# Patient Record
Sex: MALE | Race: WHITE | Employment: FULL TIME | URBAN - METROPOLITAN AREA
[De-identification: names, ages, dates, MRNs, and addresses within clinical notes are randomized per-mention and may not be internally consistent; named-entity substitution may affect disease eponyms.]

---

## 2017-10-13 ENCOUNTER — HOSPITAL ENCOUNTER (OUTPATIENT)
Dept: GENERAL RADIOLOGY | Age: 27
Discharge: HOME | End: 2017-10-13
Payer: COMMERCIAL

## 2017-10-13 DIAGNOSIS — M25.519 PAIN IN JOINT, SHOULDER REGION: ICD-10-CM

## 2017-10-13 DIAGNOSIS — M54.2 CERVICALGIA: ICD-10-CM

## 2017-10-13 PROCEDURE — 73030 X-RAY EXAM OF SHOULDER: CPT

## 2017-10-13 PROCEDURE — 72050 X-RAY EXAM NECK SPINE 4/5VWS: CPT

## 2019-06-06 ENCOUNTER — HOSPITAL ENCOUNTER (OUTPATIENT)
Dept: GENERAL RADIOLOGY | Age: 29
Discharge: HOME | End: 2019-06-06
Payer: COMMERCIAL

## 2019-06-06 DIAGNOSIS — J11.00 INFLUENZA AND PNEUMONIA: ICD-10-CM

## 2019-06-06 PROCEDURE — 71046 X-RAY EXAM CHEST 2 VIEWS: CPT

## 2019-12-14 ENCOUNTER — HOSPITAL ENCOUNTER (EMERGENCY)
Age: 29
Discharge: HOME | End: 2019-12-14
Attending: NURSE PRACTITIONER
Payer: COMMERCIAL

## 2019-12-14 ENCOUNTER — APPOINTMENT (OUTPATIENT)
Dept: CT IMAGING | Age: 29
End: 2019-12-14
Attending: PHYSICIAN ASSISTANT
Payer: COMMERCIAL

## 2019-12-14 ENCOUNTER — APPOINTMENT (OUTPATIENT)
Dept: GENERAL RADIOLOGY | Age: 29
End: 2019-12-14
Attending: PHYSICIAN ASSISTANT
Payer: COMMERCIAL

## 2019-12-14 VITALS
DIASTOLIC BLOOD PRESSURE: 80 MMHG | OXYGEN SATURATION: 100 % | HEART RATE: 83 BPM | TEMPERATURE: 98.4 F | SYSTOLIC BLOOD PRESSURE: 128 MMHG

## 2019-12-14 DIAGNOSIS — H53.8 BLURRY VISION, RIGHT EYE: ICD-10-CM

## 2019-12-14 DIAGNOSIS — R51.9 HEADACHE, UNSPECIFIED HEADACHE TYPE: Primary | ICD-10-CM

## 2019-12-14 LAB
ALBUMIN SERPL-MCNC: 4.5 G/DL (ref 3.5–5)
ALBUMIN/GLOB SERPL: 1.4 {RATIO} (ref 1.1–2.2)
ALP SERPL-CCNC: 67 U/L (ref 45–117)
ALT SERPL-CCNC: 68 U/L (ref 12–78)
ANION GAP SERPL CALC-SCNC: 5 MMOL/L (ref 5–15)
AST SERPL-CCNC: 21 U/L (ref 15–37)
BASOPHILS # BLD: 0 K/UL (ref 0–0.1)
BASOPHILS NFR BLD: 0 % (ref 0–1)
BILIRUB SERPL-MCNC: 0.5 MG/DL (ref 0.2–1)
BUN SERPL-MCNC: 11 MG/DL (ref 6–20)
BUN/CREAT SERPL: 11 (ref 12–20)
CALCIUM SERPL-MCNC: 8.7 MG/DL (ref 8.5–10.1)
CHLORIDE SERPL-SCNC: 108 MMOL/L (ref 97–108)
CO2 SERPL-SCNC: 30 MMOL/L (ref 21–32)
COMMENT, HOLDF: NORMAL
CREAT SERPL-MCNC: 0.97 MG/DL (ref 0.7–1.3)
D DIMER PPP FEU-MCNC: <0.19 MG/L FEU (ref 0–0.65)
DIFFERENTIAL METHOD BLD: ABNORMAL
EOSINOPHIL # BLD: 0.1 K/UL (ref 0–0.4)
EOSINOPHIL NFR BLD: 1 % (ref 0–7)
ERYTHROCYTE [DISTWIDTH] IN BLOOD BY AUTOMATED COUNT: 11.7 % (ref 11.5–14.5)
GLOBULIN SER CALC-MCNC: 3.2 G/DL (ref 2–4)
GLUCOSE SERPL-MCNC: 96 MG/DL (ref 65–100)
HCT VFR BLD AUTO: 45.3 % (ref 36.6–50.3)
HGB BLD-MCNC: 15.5 G/DL (ref 12.1–17)
IMM GRANULOCYTES # BLD AUTO: 0 K/UL (ref 0–0.04)
IMM GRANULOCYTES NFR BLD AUTO: 0 % (ref 0–0.5)
LYMPHOCYTES # BLD: 3.1 K/UL (ref 0.8–3.5)
LYMPHOCYTES NFR BLD: 38 % (ref 12–49)
MCH RBC QN AUTO: 28.8 PG (ref 26–34)
MCHC RBC AUTO-ENTMCNC: 34.2 G/DL (ref 30–36.5)
MCV RBC AUTO: 84.2 FL (ref 80–99)
MONOCYTES # BLD: 0.8 K/UL (ref 0–1)
MONOCYTES NFR BLD: 10 % (ref 5–13)
NEUTS SEG # BLD: 4.3 K/UL (ref 1.8–8)
NEUTS SEG NFR BLD: 51 % (ref 32–75)
NRBC # BLD: 0 K/UL (ref 0–0.01)
NRBC BLD-RTO: 0 PER 100 WBC
PLATELET # BLD AUTO: 291 K/UL (ref 150–400)
PMV BLD AUTO: 8.6 FL (ref 8.9–12.9)
POTASSIUM SERPL-SCNC: 3.9 MMOL/L (ref 3.5–5.1)
PROT SERPL-MCNC: 7.7 G/DL (ref 6.4–8.2)
RBC # BLD AUTO: 5.38 M/UL (ref 4.1–5.7)
SAMPLES BEING HELD,HOLD: NORMAL
SODIUM SERPL-SCNC: 143 MMOL/L (ref 136–145)
TROPONIN I SERPL-MCNC: <0.05 NG/ML
WBC # BLD AUTO: 8.4 K/UL (ref 4.1–11.1)

## 2019-12-14 PROCEDURE — 99284 EMERGENCY DEPT VISIT MOD MDM: CPT

## 2019-12-14 PROCEDURE — 80053 COMPREHEN METABOLIC PANEL: CPT

## 2019-12-14 PROCEDURE — 96374 THER/PROPH/DIAG INJ IV PUSH: CPT

## 2019-12-14 PROCEDURE — 85025 COMPLETE CBC W/AUTO DIFF WBC: CPT

## 2019-12-14 PROCEDURE — 71046 X-RAY EXAM CHEST 2 VIEWS: CPT

## 2019-12-14 PROCEDURE — 70450 CT HEAD/BRAIN W/O DYE: CPT

## 2019-12-14 PROCEDURE — 36415 COLL VENOUS BLD VENIPUNCTURE: CPT

## 2019-12-14 PROCEDURE — 74011250636 HC RX REV CODE- 250/636: Performed by: PHYSICIAN ASSISTANT

## 2019-12-14 PROCEDURE — 85379 FIBRIN DEGRADATION QUANT: CPT

## 2019-12-14 PROCEDURE — 84484 ASSAY OF TROPONIN QUANT: CPT

## 2019-12-14 PROCEDURE — 93005 ELECTROCARDIOGRAM TRACING: CPT

## 2019-12-14 PROCEDURE — 96375 TX/PRO/DX INJ NEW DRUG ADDON: CPT

## 2019-12-14 RX ORDER — PROCHLORPERAZINE EDISYLATE 5 MG/ML
10 INJECTION INTRAMUSCULAR; INTRAVENOUS
Status: COMPLETED | OUTPATIENT
Start: 2019-12-14 | End: 2019-12-14

## 2019-12-14 RX ORDER — DIPHENHYDRAMINE HYDROCHLORIDE 50 MG/ML
25 INJECTION, SOLUTION INTRAMUSCULAR; INTRAVENOUS
Status: COMPLETED | OUTPATIENT
Start: 2019-12-14 | End: 2019-12-14

## 2019-12-14 RX ORDER — KETOROLAC TROMETHAMINE 30 MG/ML
30 INJECTION, SOLUTION INTRAMUSCULAR; INTRAVENOUS
Status: COMPLETED | OUTPATIENT
Start: 2019-12-14 | End: 2019-12-14

## 2019-12-14 RX ORDER — BUTALBITAL, ACETAMINOPHEN, CAFFEINE AND CODEINE PHOSPHATE 50; 325; 40; 30 MG/1; MG/1; MG/1; MG/1
CAPSULE ORAL
COMMUNITY
End: 2020-08-27

## 2019-12-14 RX ADMIN — DIPHENHYDRAMINE HYDROCHLORIDE 25 MG: 50 INJECTION, SOLUTION INTRAMUSCULAR; INTRAVENOUS at 20:28

## 2019-12-14 RX ADMIN — KETOROLAC TROMETHAMINE 30 MG: 30 INJECTION, SOLUTION INTRAMUSCULAR at 20:28

## 2019-12-14 RX ADMIN — PROCHLORPERAZINE EDISYLATE 10 MG: 5 INJECTION INTRAMUSCULAR; INTRAVENOUS at 20:28

## 2019-12-14 RX ADMIN — SODIUM CHLORIDE 1000 ML: 900 INJECTION, SOLUTION INTRAVENOUS at 20:28

## 2019-12-15 LAB
ATRIAL RATE: 71 BPM
CALCULATED P AXIS, ECG09: 42 DEGREES
CALCULATED R AXIS, ECG10: 32 DEGREES
CALCULATED T AXIS, ECG11: 27 DEGREES
DIAGNOSIS, 93000: NORMAL
P-R INTERVAL, ECG05: 118 MS
Q-T INTERVAL, ECG07: 374 MS
QRS DURATION, ECG06: 86 MS
QTC CALCULATION (BEZET), ECG08: 406 MS
VENTRICULAR RATE, ECG03: 71 BPM

## 2019-12-15 NOTE — ED PROVIDER NOTES
66-year-old  male with medical history remarkable for anxiety, depression and variant migraine presenting ambulatory to the emergency department with complaint of acute onset of headache, chest pain, neck pain and blurry vision in the right eye that began suddenly around 5 PM today. Since arriving to the ED developed cough described as dry and frequent in nature. He tried taking ibuprofen for symptoms with minimal improvement. There is associated sore throat. Chest, head, throat and blurry vision  In the rt eye  today around 5 PM today. Developed cough upon arrival.  No fever, ear pain, abdominal pain,nausea, vomiting, diarrhea or urinary complaint. Generalized malaise and myalgias in LE. No extremity numbness or extremity weakness. Denies corrective lens usage at baseline. No past medical history on file. Past Surgical History:   Procedure Laterality Date    HX ORTHOPAEDIC      foot    HX OTHER SURGICAL      cystocele         No family history on file.     Social History     Socioeconomic History    Marital status: SINGLE     Spouse name: Not on file    Number of children: Not on file    Years of education: Not on file    Highest education level: Not on file   Occupational History    Not on file   Social Needs    Financial resource strain: Not on file    Food insecurity:     Worry: Not on file     Inability: Not on file    Transportation needs:     Medical: Not on file     Non-medical: Not on file   Tobacco Use    Smoking status: Current Every Day Smoker     Packs/day: 0.25   Substance and Sexual Activity    Alcohol use: No    Drug use: No    Sexual activity: Not on file   Lifestyle    Physical activity:     Days per week: Not on file     Minutes per session: Not on file    Stress: Not on file   Relationships    Social connections:     Talks on phone: Not on file     Gets together: Not on file     Attends Evangelical service: Not on file     Active member of club or organization: Not on file     Attends meetings of clubs or organizations: Not on file     Relationship status: Not on file    Intimate partner violence:     Fear of current or ex partner: Not on file     Emotionally abused: Not on file     Physically abused: Not on file     Forced sexual activity: Not on file   Other Topics Concern    Not on file   Social History Narrative    Not on file         ALLERGIES: Patient has no known allergies. Review of Systems   Constitutional: Positive for activity change and fatigue. Negative for chills and fever. HENT: Positive for rhinorrhea and sore throat. Negative for congestion, ear pain and voice change. Eyes: Positive for visual disturbance. Negative for photophobia, pain and itching. Respiratory: Positive for cough and shortness of breath. Negative for chest tightness. Cardiovascular: Positive for chest pain. Negative for palpitations. Gastrointestinal: Negative for abdominal distention, abdominal pain, constipation, diarrhea and vomiting. Genitourinary: Negative for difficulty urinating, dysuria, frequency and urgency. Musculoskeletal: Positive for myalgias and neck pain. Negative for joint swelling. Neurological: Positive for headaches. Negative for weakness and numbness. Psychiatric/Behavioral: Negative for confusion and decreased concentration. All other systems reviewed and are negative. Vitals:    12/14/19 1917   BP: 128/80   Pulse: 83   Temp: 98.4 °F (36.9 °C)   SpO2: 100%            Physical Exam  Vitals signs and nursing note reviewed. Constitutional:       General: He is not in acute distress. Appearance: He is well-developed. He is not diaphoretic. Comments:  male in NAD   HENT:      Head: Normocephalic and atraumatic. Right Ear: External ear normal.      Left Ear: External ear normal.      Nose: Nose normal.      Mouth/Throat:      Pharynx: No oropharyngeal exudate.    Eyes:      General: No visual field deficit. Right eye: No discharge. Left eye: No discharge. Conjunctiva/sclera: Conjunctivae normal.      Pupils: Pupils are equal, round, and reactive to light. Comments: Decreased vision in rt eye by confrontation   Neck:      Musculoskeletal: Normal range of motion and neck supple. Cardiovascular:      Rate and Rhythm: Normal rate and regular rhythm. Heart sounds: Normal heart sounds. Pulmonary:      Effort: Pulmonary effort is normal.      Breath sounds: Normal breath sounds. No wheezing or rales. Abdominal:      General: Bowel sounds are normal. There is no distension. Palpations: Abdomen is soft. Tenderness: There is no tenderness. There is no guarding. Musculoskeletal: Normal range of motion. Lymphadenopathy:      Cervical: No cervical adenopathy. Skin:     General: Skin is warm and dry. Neurological:      General: No focal deficit present. Mental Status: He is alert and oriented to person, place, and time. Cranial Nerves: No cranial nerve deficit. Sensory: No sensory deficit. Motor: No weakness or abnormal muscle tone. Coordination: Coordination normal.      Deep Tendon Reflexes: Reflexes are normal and symmetric. Psychiatric:         Behavior: Behavior normal.          MDM  Number of Diagnoses or Management Options  Blurry vision, right eye:   Headache, unspecified headache type:   Diagnosis management comments: 35 yo  male with complaint of HA, rt eye vision loss, CP, ST and cough. Atypical for ICH. ? Vascular HA. Improved with Migraine cocktail. Labs reassuring. 20/30 vision in rt eye.   [facility] [address]         Amount and/or Complexity of Data Reviewed  Clinical lab tests: ordered and reviewed  Tests in the radiology section of CPT®: ordered and reviewed  Independent visualization of images, tracings, or specimens: yes           Procedures    Progress note      EKG interpretation:   Rhythm: normal sinus rhythm; and regular . Rate (approx.): 71; Axis: normal; P wave: normal; QRS interval: normal ; ST/T wave: normal; in  Leads. Kendell Brice    Patient's results have been reviewed with them. Patient and/or family have verbally conveyed their understanding and agreement of the patient's signs, symptoms, diagnosis, treatment and prognosis and additionally agree to follow up as recommended or return to the Emergency Room should their condition change prior to follow-up. Discharge instructions have also been provided to the patient with some educational information regarding their diagnosis as well a list of reasons why they would want to return to the ER prior to their follow-up appointment should their condition change. Kendell Brice      Discussed case with attending Physician Jerri Menchaca. Agrees with care and will D/C with follow up.   Kendell Epperson

## 2019-12-15 NOTE — DISCHARGE INSTRUCTIONS

## 2019-12-15 NOTE — ED TRIAGE NOTES
Patient reports sudden cough. Chest pain, headache, right/posterior neck pain since 1700. Patient also c/o blurry vision in right eye that began with chest pain and headache. C/o generalized weakness.

## 2020-03-11 ENCOUNTER — HOSPITAL ENCOUNTER (EMERGENCY)
Age: 30
Discharge: HOME | End: 2020-03-11
Attending: EMERGENCY MEDICINE
Payer: COMMERCIAL

## 2020-03-11 VITALS
RESPIRATION RATE: 16 BRPM | TEMPERATURE: 98.2 F | OXYGEN SATURATION: 99 % | HEART RATE: 75 BPM | SYSTOLIC BLOOD PRESSURE: 144 MMHG | DIASTOLIC BLOOD PRESSURE: 86 MMHG

## 2020-03-11 DIAGNOSIS — K02.9 PAIN DUE TO DENTAL CARIES: Primary | ICD-10-CM

## 2020-03-11 PROCEDURE — 99282 EMERGENCY DEPT VISIT SF MDM: CPT

## 2020-03-11 PROCEDURE — 99283 EMERGENCY DEPT VISIT LOW MDM: CPT

## 2020-03-11 PROCEDURE — 74011000250 HC RX REV CODE- 250: Performed by: PHYSICIAN ASSISTANT

## 2020-03-11 PROCEDURE — 74011250637 HC RX REV CODE- 250/637: Performed by: PHYSICIAN ASSISTANT

## 2020-03-11 RX ORDER — TRAMADOL HYDROCHLORIDE 50 MG/1
50 TABLET ORAL
Qty: 9 TAB | Refills: 0 | Status: SHIPPED | OUTPATIENT
Start: 2020-03-11 | End: 2020-03-14

## 2020-03-11 RX ADMIN — LIDOCAINE HYDROCHLORIDE: 20 SOLUTION ORAL; TOPICAL at 19:21

## 2020-03-11 NOTE — ED PROVIDER NOTES
34year old male presenting to the ED for facial pain. Pt reports pain from the left ear to the left chin, that waxes and wanes from 0/10 to 11/10. Went to [employer] yesterday, given penicillin, diclofenac, notes that he has had no relief. Pt notes that there is one particular tooth that he notices seems to reproduce the pain, notes that if he hits the tooth while eating he will have sudden onset of severe pain. Pain started yesterday, notes that he previously knew that he needed to have the tooth removed. Has dentist appt scheduled for this Saturday (in 3 days). Some feeling of swelling, no fever or drainage. + pressure. No dysphagia. PMHx: depression, anxiety  PSx: cystocele, foot surgery, debridement of hip bone  Social: + tobacco.             Past Medical History:   Diagnosis Date    Anxiety     Depression     Headache, variant migraine        Past Surgical History:   Procedure Laterality Date    HX ORTHOPAEDIC      foot    HX OTHER SURGICAL      cystocele         History reviewed. No pertinent family history.     Social History     Socioeconomic History    Marital status: SINGLE     Spouse name: Not on file    Number of children: Not on file    Years of education: Not on file    Highest education level: Not on file   Occupational History    Not on file   Social Needs    Financial resource strain: Not on file    Food insecurity     Worry: Not on file     Inability: Not on file    Transportation needs     Medical: Not on file     Non-medical: Not on file   Tobacco Use    Smoking status: Current Every Day Smoker     Packs/day: 0.25    Smokeless tobacco: Never Used   Substance and Sexual Activity    Alcohol use: No    Drug use: No    Sexual activity: Not on file   Lifestyle    Physical activity     Days per week: Not on file     Minutes per session: Not on file    Stress: Not on file   Relationships    Social connections     Talks on phone: Not on file     Gets together: Not on file Attends Hinduism service: Not on file     Active member of club or organization: Not on file     Attends meetings of clubs or organizations: Not on file     Relationship status: Not on file    Intimate partner violence     Fear of current or ex partner: Not on file     Emotionally abused: Not on file     Physically abused: Not on file     Forced sexual activity: Not on file   Other Topics Concern    Not on file   Social History Narrative    Not on file         ALLERGIES: Patient has no known allergies. Review of Systems   Constitutional: Negative for fever. HENT: Positive for dental problem. Negative for facial swelling. Respiratory: Negative for shortness of breath. Cardiovascular: Negative for chest pain. Gastrointestinal: Negative for vomiting. Skin: Negative for wound. Neurological: Negative for syncope. All other systems reviewed and are negative. Vitals:    03/11/20 1700   BP: 144/86   Pulse: 75   Resp: 16   Temp: 98.2 °F (36.8 °C)   SpO2: 99%            Physical Exam  Vitals signs and nursing note reviewed. Constitutional:       General: He is not in acute distress. Appearance: He is well-developed. Comments: Pleasant WM   HENT:      Head: Normocephalic and atraumatic. Right Ear: External ear normal.      Left Ear: External ear normal.      Mouth/Throat:        Comments: Normal sublingual space  No submandibular edema  No facial swelling or cellulitis  No trismus, swelling, drooling  Eyes:      General: No scleral icterus. Conjunctiva/sclera: Conjunctivae normal.   Neck:      Musculoskeletal: Neck supple. Trachea: No tracheal deviation. Cardiovascular:      Rate and Rhythm: Normal rate and regular rhythm. Heart sounds: Normal heart sounds. No murmur. No friction rub. No gallop. Pulmonary:      Effort: Pulmonary effort is normal. No respiratory distress. Breath sounds: Normal breath sounds. No stridor. No wheezing.    Abdominal: General: There is no distension. Palpations: Abdomen is soft. Musculoskeletal: Normal range of motion. Skin:     General: Skin is warm and dry. Neurological:      Mental Status: He is alert and oriented to person, place, and time. Psychiatric:         Behavior: Behavior normal.          MDM  Number of Diagnoses or Management Options  Diagnosis management comments: 40-year-old male presenting to the ER for pain at the site of a large dental cavity. Unremarkable , pt reports inability to sleep. Will treat with topical anesthetic and 5 tabs of pain medicine PRN, has dental appt in 3 days.        Amount and/or Complexity of Data Reviewed  Discuss the patient with other providers: yes (Dr. Nellie Murdock ED attending)           Procedures

## 2020-03-11 NOTE — DISCHARGE INSTRUCTIONS
Patient Education        Tooth Decay: Care Instructions  Your Care Instructions    Tooth decay is damage to a tooth caused by plaque. Plaque is a thin film of bacteria that sticks to the teeth above and below the gum line. If plaque isn't removed from the teeth, it can build up and harden into tartar. The bacteria in plaque and tartar use sugars in food to make acids. These acids can cause tooth decay and gum disease. Any part of your tooth can decay, from the roots below the gum line to the chewing surface. Decay can affect the outer layer (enamel) or inner layer (dentin) of your teeth. The deeper the decay, the worse the damage. Untreated tooth decay will get worse and may lead to tooth loss. If you have a small hole (cavity) in your tooth, your dentist can repair it by removing the decay and filling the hole. If you have deeper decay, you may need more treatment. A very badly damaged tooth may have to be removed. Follow-up care is a key part of your treatment and safety. Be sure to make and go to all appointments, and call your dentist if you are having problems. It's also a good idea to know your test results and keep a list of the medicines you take. How can you care for yourself at home? If you have pain:  · Take an over-the-counter pain medicine, such as acetaminophen (Tylenol), ibuprofen (Advil, Motrin), or naproxen (Aleve). Be safe with medicines. Read and follow all instructions on the label. ? Do not take two or more pain medicines at the same time unless the doctor told you to. Many pain medicines have acetaminophen, which is Tylenol. Too much acetaminophen (Tylenol) can be harmful. · Put ice or a cold pack on your cheek over the tooth for 10 to 15 minutes at a time. Put a thin cloth between the ice and your skin. To prevent tooth decay  · Brush teeth twice a day, and floss once a day. Brushing with fluoride toothpaste and flossing may be enough to reverse early decay.   · Use a toothbrush with soft, rounded-end bristles and a head that is small enough to reach all parts of your teeth and mouth. Replace your toothbrush every 3 or 4 months. You may also use an electric toothbrush that has rotating and oscillating (back-and-forth) action. · Ask your dentist about having fluoride treatments at the dental office. · Brush your tongue to help get rid of bacteria. · Eat healthy foods that include whole grains, vegetables, and fruits. · Have your teeth cleaned by a professional at least two times a year. · Do not smoke or use smokeless tobacco. Tobacco can make tooth decay worse. When should you call for help? Call 911 anytime you think you may need emergency care. For example, call if:    · You have trouble breathing.    Call your dentist now or seek immediate medical care if:    · You have new or worse symptoms of infection, such as:  ? Increased pain, swelling, warmth, or redness. ? Red streaks leading from the area. ? Pus draining from the area. ? A fever.    Watch closely for changes in your health, and be sure to contact your doctor if:    · You do not get better as expected. Where can you learn more? Go to http://bell-robyn.info/. Enter G415 in the search box to learn more about \"Tooth Decay: Care Instructions. \"  Current as of: October 3, 2018  Content Version: 12.2  © 5988-9194 [employer]. Care instructions adapted under license by [employer] (which disclaims liability or warranty for this information). If you have questions about a medical condition or this instruction, always ask your healthcare professional. [place] [address] any warranty or liability for your use of this information.

## 2020-03-11 NOTE — ED TRIAGE NOTES
Patient presents from home with complaints of left jaw pain that started yesterday. Patient was seen at [facility] for this issue and started on an antibiotic.   Patient reports he has a tooth that needs to come out and he has an appointment with his dentist on Saturday to get he tooth taken out

## 2020-08-06 ENCOUNTER — HOSPITAL ENCOUNTER (EMERGENCY)
Age: 30
Discharge: HOME | End: 2020-08-06
Attending: EMERGENCY MEDICINE
Payer: COMMERCIAL

## 2020-08-06 ENCOUNTER — APPOINTMENT (OUTPATIENT)
Dept: GENERAL RADIOLOGY | Age: 30
End: 2020-08-06
Attending: EMERGENCY MEDICINE
Payer: COMMERCIAL

## 2020-08-06 VITALS
DIASTOLIC BLOOD PRESSURE: 77 MMHG | BODY MASS INDEX: 20.8 KG/M2 | RESPIRATION RATE: 16 BRPM | HEIGHT: 65 IN | SYSTOLIC BLOOD PRESSURE: 135 MMHG | OXYGEN SATURATION: 100 % | TEMPERATURE: 98.4 F | HEART RATE: 68 BPM

## 2020-08-06 DIAGNOSIS — R06.02 SOB (SHORTNESS OF BREATH): Primary | ICD-10-CM

## 2020-08-06 DIAGNOSIS — R05.9 COUGH: ICD-10-CM

## 2020-08-06 LAB
ALBUMIN SERPL-MCNC: 4.6 G/DL (ref 3.5–5)
ALBUMIN/GLOB SERPL: 1.4 {RATIO} (ref 1.1–2.2)
ALP SERPL-CCNC: 64 U/L (ref 45–117)
ALT SERPL-CCNC: 29 U/L (ref 12–78)
ANION GAP SERPL CALC-SCNC: 1 MMOL/L (ref 5–15)
AST SERPL-CCNC: 12 U/L (ref 15–37)
BASOPHILS # BLD: 0 K/UL (ref 0–0.1)
BASOPHILS NFR BLD: 0 % (ref 0–1)
BILIRUB SERPL-MCNC: 1 MG/DL (ref 0.2–1)
BUN SERPL-MCNC: 18 MG/DL (ref 6–20)
BUN/CREAT SERPL: 19 (ref 12–20)
CALCIUM SERPL-MCNC: 8.8 MG/DL (ref 8.5–10.1)
CHLORIDE SERPL-SCNC: 107 MMOL/L (ref 97–108)
CK SERPL-CCNC: 87 U/L (ref 39–308)
CO2 SERPL-SCNC: 29 MMOL/L (ref 21–32)
CREAT SERPL-MCNC: 0.97 MG/DL (ref 0.7–1.3)
DIFFERENTIAL METHOD BLD: ABNORMAL
EOSINOPHIL # BLD: 0 K/UL (ref 0–0.4)
EOSINOPHIL NFR BLD: 0 % (ref 0–7)
ERYTHROCYTE [DISTWIDTH] IN BLOOD BY AUTOMATED COUNT: 11.4 % (ref 11.5–14.5)
GLOBULIN SER CALC-MCNC: 3.3 G/DL (ref 2–4)
GLUCOSE SERPL-MCNC: 111 MG/DL (ref 65–100)
HCT VFR BLD AUTO: 43.8 % (ref 36.6–50.3)
HGB BLD-MCNC: 15.5 G/DL (ref 12.1–17)
IMM GRANULOCYTES # BLD AUTO: 0 K/UL (ref 0–0.04)
IMM GRANULOCYTES NFR BLD AUTO: 0 % (ref 0–0.5)
LYMPHOCYTES # BLD: 1.6 K/UL (ref 0.8–3.5)
LYMPHOCYTES NFR BLD: 20 % (ref 12–49)
MCH RBC QN AUTO: 29.5 PG (ref 26–34)
MCHC RBC AUTO-ENTMCNC: 35.4 G/DL (ref 30–36.5)
MCV RBC AUTO: 83.4 FL (ref 80–99)
MONOCYTES # BLD: 0.5 K/UL (ref 0–1)
MONOCYTES NFR BLD: 6 % (ref 5–13)
NEUTS SEG # BLD: 6.1 K/UL (ref 1.8–8)
NEUTS SEG NFR BLD: 74 % (ref 32–75)
NRBC # BLD: 0 K/UL (ref 0–0.01)
NRBC BLD-RTO: 0 PER 100 WBC
PLATELET # BLD AUTO: 279 K/UL (ref 150–400)
PMV BLD AUTO: 8.5 FL (ref 8.9–12.9)
POTASSIUM SERPL-SCNC: 3.8 MMOL/L (ref 3.5–5.1)
PROT SERPL-MCNC: 7.9 G/DL (ref 6.4–8.2)
RBC # BLD AUTO: 5.25 M/UL (ref 4.1–5.7)
SODIUM SERPL-SCNC: 137 MMOL/L (ref 136–145)
TROPONIN I SERPL-MCNC: <0.05 NG/ML
WBC # BLD AUTO: 8.3 K/UL (ref 4.1–11.1)

## 2020-08-06 PROCEDURE — 36415 COLL VENOUS BLD VENIPUNCTURE: CPT

## 2020-08-06 PROCEDURE — 84484 ASSAY OF TROPONIN QUANT: CPT

## 2020-08-06 PROCEDURE — 93005 ELECTROCARDIOGRAM TRACING: CPT

## 2020-08-06 PROCEDURE — 80053 COMPREHEN METABOLIC PANEL: CPT

## 2020-08-06 PROCEDURE — 99284 EMERGENCY DEPT VISIT MOD MDM: CPT

## 2020-08-06 PROCEDURE — 71045 X-RAY EXAM CHEST 1 VIEW: CPT

## 2020-08-06 PROCEDURE — 87635 SARS-COV-2 COVID-19 AMP PRB: CPT

## 2020-08-06 PROCEDURE — 82550 ASSAY OF CK (CPK): CPT

## 2020-08-06 PROCEDURE — 85025 COMPLETE CBC W/AUTO DIFF WBC: CPT

## 2020-08-06 NOTE — ED NOTES
Assumed care of pt, pt alert and oriented x 4; c/o sob & lightheadedness x 4-5 hours; nausea, sore throat, cough; runny nose x 3 days; placed on monitor x 3; pt has a hx of anxiety; Dr Pipe Hung at bedside for eval; call bell within reach, will continue to monitor

## 2020-08-07 ENCOUNTER — PATIENT OUTREACH (OUTPATIENT)
Dept: CASE MANAGEMENT | Age: 30
End: 2020-08-07

## 2020-08-07 LAB
ATRIAL RATE: 78 BPM
CALCULATED P AXIS, ECG09: 85 DEGREES
CALCULATED R AXIS, ECG10: 64 DEGREES
CALCULATED T AXIS, ECG11: 56 DEGREES
DIAGNOSIS, 93000: NORMAL
P-R INTERVAL, ECG05: 152 MS
Q-T INTERVAL, ECG07: 360 MS
QRS DURATION, ECG06: 82 MS
QTC CALCULATION (BEZET), ECG08: 410 MS
VENTRICULAR RATE, ECG03: 78 BPM

## 2020-08-07 NOTE — ED PROVIDER NOTES
EMERGENCY DEPARTMENT HISTORY AND PHYSICAL EXAM      Date: 8/6/2020  Patient Name: Miguel Angel Sosa    History of Presenting Illness     Chief Complaint   Patient presents with    Shortness of Breath     Patient Reports SOB, Non-Productive Cough, Nausea and Lightheadness that Started Today. Patient Denies Any Resp. History        History Provided By: Patient    HPI: Miguel Angel Sosa, 27 y.o. male with history of anxiety, depression, hypertension presents to the ED with cc of shortness of breath, cough, lightheadedness. Patient noticed symptoms when he was shopping at the grocery store today. He felt lightheaded on his feet and felt like he might pass out. He has not experienced any syncope. Denies any chest pain, chest tightness, palpitations. He states that he has been feeling short of breath all day today. He does have dry nonproductive cough. He does endorse generalized nausea but without vomiting. He has had intermittent episodes of diarrhea over the past few days. Denies any loss of sense of smell or sense of taste. 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 He denies any other symptoms. There are no other complaints, changes, or physical findings at this time. PCP: Jean Forrest MD    No current facility-administered medications on file prior to encounter. Current Outpatient Medications on File Prior to Encounter   Medication Sig Dispense Refill    codeine-butalbital-acetaminophen-caffeine (FIORICET WITH CODEINE) -63-30 mg capsule Take  by mouth every six (6) hours as needed for Headache.  bupropion HCl (WELLBUTRIN PO) Take  by mouth daily.  ALPRAZOLAM PO Take  by mouth as needed.          Past History     Past Medical History:  Past Medical History:   Diagnosis Date    Anxiety     Depression     Headache, variant migraine        Past Surgical History:  Past Surgical History:   Procedure Laterality Date    HX ORTHOPAEDIC      foot    HX OTHER SURGICAL      cystocele       Family History:  No family history on file. Social History:  Social History     Tobacco Use    Smoking status: Current Every Day Smoker     Packs/day: 0.25    Smokeless tobacco: Never Used   Substance Use Topics    Alcohol use: No    Drug use: No       Allergies:  No Known Allergies      Review of Systems   Review of Systems   Constitutional: Negative for chills and fever. HENT: Negative. Eyes: Negative for visual disturbance. Respiratory: Positive for cough and shortness of breath. Cardiovascular: Negative for chest pain and leg swelling. Gastrointestinal: Positive for diarrhea and nausea. Negative for abdominal pain and vomiting. Genitourinary: Negative. Musculoskeletal: Negative for back pain and gait problem. Skin: Negative for color change and rash. Neurological: Positive for light-headedness. Negative for dizziness, weakness and headaches. Hematological: Does not bruise/bleed easily. All other systems reviewed and are negative. Physical Exam   Physical Exam  Vitals signs reviewed. Constitutional:       General: He is not in acute distress. Appearance: Normal appearance. He is not ill-appearing, toxic-appearing or diaphoretic. HENT:      Head: Normocephalic and atraumatic. Cardiovascular:      Rate and Rhythm: Normal rate and regular rhythm. Heart sounds: Normal heart sounds. No murmur. Pulmonary:      Effort: Pulmonary effort is normal. No respiratory distress. Breath sounds: Normal breath sounds. No wheezing. Abdominal:      Palpations: Abdomen is soft. Tenderness: There is no abdominal tenderness. There is no guarding or rebound. Skin:     General: Skin is warm and dry. Findings: No erythema or rash. Neurological:      General: No focal deficit present. Mental Status: He is alert and oriented to person, place, and time. Diagnostic Study Results     Labs -     Recent Results (from the past 12 hour(s))   EKG, 12 LEAD, INITIAL    Collection Time: 08/06/20  6:31 PM   Result Value Ref Range    Ventricular Rate 78 BPM    Atrial Rate 78 BPM    P-R Interval 152 ms    QRS Duration 82 ms    Q-T Interval 360 ms    QTC Calculation (Bezet) 410 ms    Calculated P Axis 85 degrees    Calculated R Axis 64 degrees    Calculated T Axis 56 degrees    Diagnosis       Normal sinus rhythm  Normal ECG  When compared with ECG of 14-DEC-2019 19:59,  No significant change was found     CBC WITH AUTOMATED DIFF    Collection Time: 08/06/20  6:33 PM   Result Value Ref Range    WBC 8.3 4.1 - 11.1 K/uL    RBC 5.25 4. 10 - 5.70 M/uL    HGB 15.5 12.1 - 17.0 g/dL    HCT 43.8 36.6 - 50.3 %    MCV 83.4 80.0 - 99.0 FL    MCH 29.5 26.0 - 34.0 PG    MCHC 35.4 30.0 - 36.5 g/dL    RDW 11.4 (L) 11.5 - 14.5 %    PLATELET 493 735 - 499 K/uL    MPV 8.5 (L) 8.9 - 12.9 FL    NRBC 0.0 0  WBC    ABSOLUTE NRBC 0.00 0.00 - 0.01 K/uL    NEUTROPHILS 74 32 - 75 %    LYMPHOCYTES 20 12 - 49 %    MONOCYTES 6 5 - 13 %    EOSINOPHILS 0 0 - 7 %    BASOPHILS 0 0 - 1 %    IMMATURE GRANULOCYTES 0 0.0 - 0.5 %    ABS. NEUTROPHILS 6.1 1.8 - 8.0 K/UL    ABS. LYMPHOCYTES 1.6 0.8 - 3.5 K/UL    ABS. MONOCYTES 0.5 0.0 - 1.0 K/UL    ABS. EOSINOPHILS 0.0 0.0 - 0.4 K/UL    ABS. BASOPHILS 0.0 0.0 - 0.1 K/UL    ABS. IMM.  GRANS. 0.0 0.00 - 0.04 K/UL    DF AUTOMATED     METABOLIC PANEL, COMPREHENSIVE    Collection Time: 08/06/20  6:33 PM   Result Value Ref Range    Sodium 137 136 - 145 mmol/L    Potassium 3.8 3.5 - 5.1 mmol/L    Chloride 107 97 - 108 mmol/L    CO2 29 21 - 32 mmol/L    Anion gap 1 (L) 5 - 15 mmol/L    Glucose 111 (H) 65 - 100 mg/dL    BUN 18 6 - 20 MG/DL    Creatinine 0.97 0.70 - 1.30 MG/DL    BUN/Creatinine ratio 19 12 - 20      GFR est AA >60 >60 ml/min/1.73m2    GFR est non-AA >60 >60 ml/min/1.73m2    Calcium 8.8 8.5 - 10.1 MG/DL    Bilirubin, total 1.0 0.2 - 1.0 MG/DL    ALT (SGPT) 29 12 - 78 U/L    AST (SGOT) 12 (L) 15 - 37 U/L    Alk. phosphatase 64 45 - 117 U/L    Protein, total 7.9 6.4 - 8.2 g/dL    Albumin 4.6 3.5 - 5.0 g/dL    Globulin 3.3 2.0 - 4.0 g/dL    A-G Ratio 1.4 1.1 - 2.2     CK W/ REFLX CKMB    Collection Time: 08/06/20  6:33 PM   Result Value Ref Range    CK 87 39 - 308 U/L   TROPONIN I    Collection Time: 08/06/20  6:33 PM   Result Value Ref Range    Troponin-I, Qt. <0.05 <0.05 ng/mL   SARS-COV-2    Collection Time: 08/06/20  9:04 PM   Result Value Ref Range    Specimen source Nasopharyngeal      SARS-CoV-2 PENDING     SARS-CoV-2 PENDING     Specimen source Nasopharyngeal      COVID-19 rapid test PENDING     Specimen type NP Swab      Health status PENDING     COVID-19 PENDING        Radiologic Studies -   XR CHEST PORT   Final Result   IMPRESSION: No Acute Disease. CT Results  (Last 48 hours)    None        CXR Results  (Last 48 hours)               08/06/20 1950  XR CHEST PORT Final result    Impression:  IMPRESSION: No Acute Disease. Narrative:  EXAM: Portable CXR. 1940 hours. INDICATION: Shortness of breath       FINDINGS:   The lungs appear clear. Heart is normal in size. There is no pulmonary edema. There is no evident pneumothorax, adenopathy or pleural effusion. Medical Decision Making   I am the first provider for this patient. I reviewed the vital signs, available nursing notes, past medical history, past surgical history, family history and social history. Vital Signs-Reviewed the patient's vital signs. Patient Vitals for the past 12 hrs:   Temp Pulse Resp BP SpO2   08/06/20 2010 -- 68 -- 135/77 100 %   08/06/20 2009 -- 69 -- 139/71 100 %   08/06/20 2008 -- 70 -- 136/63 100 %   08/06/20 1828 98.4 °F (36.9 °C) 80 16 157/72 100 %       Records Reviewed: Nursing Notes    Provider Notes (Medical Decision Making):   80-year-old male here with shortness of breath, cough, general lightheadedness.   On examination appears clinically well and nontoxic. He is afebrile and vital signs are stable. O2 sats 100% on room air. There is no increased work of breathing. Lungs clear to auscultation bilaterally. Chest x-ray unremarkable and blood work is reassuring. Troponin negative. He is afebrile. I will obtain orthostatics and ensure there is no ambulatory hypoxia. I will obtain SARS-CoV-2 test and anticipate discharge home. ED Course:   Initial assessment performed. The patients presenting problems have been discussed, and they are in agreement with the care plan formulated and outlined with them. I have encouraged them to ask questions as they arise throughout their visit. ED Course as of Aug 06 2343   Thu Aug 06, 2020   2007 EKG per my interpretation normal sinus rhythm, rate 78 bpm, normal axis, no acute ischemic changes. [AK]   2020 Orthostatics negative. Patient is able to ambulate throughout ED without severe respiratory distress or hypoxia. I feel he can be safely discharged home. I will obtain SARS-CoV-2 swab before he is discharged. Patient will be notified of any results. Patient given strict return to ED precautions and encouraged to keep an eye on pulse oximetry at home. All questions answered and he agrees with plan as above. [AK]      ED Course User Index  [AK] Jo Sharma MD       Discharge Note:  The patient has been re-evaluated and is ready for discharge. Reviewed available results with patient. Counseled patient on diagnosis and care plan. Patient has expressed understanding, and all questions have been answered. Patient agrees with plan and agrees to follow up as recommended, or to return to the ED if their symptoms worsen. Discharge instructions have been provided and explained to the patient, along with reasons to return to the ED. Disposition:  Discharge home    DISCHARGE PLAN:  1. Discharge Medication List as of 8/6/2020  9:13 PM        2.    Follow-up Information Follow up With Specialties Details Why [address] Gomez MD Internal Medicine Schedule an appointment as soon as possible for a visit   [address]  [phone]      [facility] [place] EMERGENCY DEPT Emergency Medicine Go to  As needed, If symptoms worsen [address]  [address]  [phone]        3. Return to ED if worse     Diagnosis     Clinical Impression:   1. SOB (shortness of breath)    2. Cough        Attestations:    Daljit Carter MD    Please note that this dictation was completed with [employer], the [employer] voice recognition software. Quite often unanticipated grammatical, syntax, homophones, and other interpretive errors are inadvertently transcribed by the computer software. Please disregard these errors. Please excuse any errors that have escaped final proofreading. Thank you.

## 2020-08-07 NOTE — PROGRESS NOTES
Patient contacted regarding recent discharge and COVID-19 risk. Discussed COVID-19 related testing which was pending at this time. Test results were pending. Patient informed of results, if available? ACM discussed pending status and note from PA stating results were negative. Patient advised to continue checking myChart for results to post and follow CDC guidelines. Patient verbalized understanding. Ambulatory Care Manager contacted the patient by telephone to perform post discharge assessment. Verified name and  with patient as identifiers. Patient has following risk factors of: ED visit 20. ACM reviewed discharge instructions, medical action plan and red flags related to discharge diagnosis. There were no new or changed medications related to discharge diagnosis. Patient denied questions or concerns regarding discharge instructions or medications. Advance Care Planning:   Does patient have an Advance Directive: not on file; education provided     Patient verified healthcare decision makers as correctly listed in chart: Andreas Bernal (parent) [phone]    Education provided regarding infection prevention, and signs and symptoms of COVID-19 and when to seek medical attention with patient who verbalized understanding. Discussed exposure protocols and quarantine from [address] [place] [address] you at higher risk for severe illness  and given an opportunity for questions and concerns. The patient was supplied the COVID-19 hotline [phone] and local [facility] department hotline [phone]. AC recommended patient follow up with PCP and provided ACM contact information for future reference. From CDC: Are you at higher risk for severe illness?  Wash your hands often and avoid touching eyes, nose and mouth.  Avoid close contact (6 feet, which is about two arm lengths) with people who are sick.    Put distance between yourself and other people if COVID-19 is spreading in your community.  Clean and disinfect frequently touched surfaces.  Avoid all cruise travel and non-essential air travel.  Call your healthcare professional if you have concerns about COVID-19 and your underlying condition or if you are sick. For more information on steps you can take to protect yourself, see CDC's How to Protect Yourself      Patient/family/caregiver given information for GetWell Loop and agrees to enroll yes  Patient's preferred e-mail:  [email or web address]  Patient's preferred phone number: [phone]  Based on Loop alert triggers, patient will be contacted by nurse care manager for worsening symptoms. Pt will be further monitored by COVID Loop Team based on severity of symptoms and risk factors.     Broadus Sandhoff, RN  Ambulatory Care Manager

## 2020-08-08 LAB
COVID-19, XGCOVT: NOT DETECTED
HEALTH STATUS, XMCV2T: NORMAL
SOURCE, COVRS: NORMAL
SPECIMEN SOURCE, FCOV2M: NORMAL
SPECIMEN TYPE, XMCV1T: NORMAL

## 2020-08-12 ENCOUNTER — PATIENT OUTREACH (OUTPATIENT)
Dept: CASE MANAGEMENT | Age: 30
End: 2020-08-12

## 2020-08-12 NOTE — PROGRESS NOTES
Yellow alert noted in remote symptom monitoring program. Messaged patient to notify Gt Perez if symptoms have worsened since yesterday or if they would like to have a nurse reach out.

## 2020-08-27 ENCOUNTER — OFFICE VISIT (OUTPATIENT)
Dept: URGENT CARE | Age: 30
End: 2020-08-27
Payer: COMMERCIAL

## 2020-08-27 VITALS — HEART RATE: 66 BPM | OXYGEN SATURATION: 96 % | RESPIRATION RATE: 15 BRPM | TEMPERATURE: 98.2 F

## 2020-08-27 DIAGNOSIS — J02.9 SORE THROAT: ICD-10-CM

## 2020-08-27 DIAGNOSIS — R05.9 COUGH: Primary | ICD-10-CM

## 2020-08-27 DIAGNOSIS — Z11.59 SCREENING FOR VIRAL DISEASE: ICD-10-CM

## 2020-08-27 PROCEDURE — 99203 OFFICE O/P NEW LOW 30 MIN: CPT | Performed by: FAMILY MEDICINE

## 2020-08-27 RX ORDER — BUPROPION HYDROCHLORIDE 75 MG/1
75 TABLET ORAL 2 TIMES DAILY
COMMUNITY

## 2020-08-27 RX ORDER — AMOXICILLIN 500 MG/1
CAPSULE ORAL
COMMUNITY
Start: 2020-06-16

## 2020-08-27 RX ORDER — ALPRAZOLAM 0.25 MG/1
0.25 TABLET ORAL
COMMUNITY

## 2020-08-27 RX ORDER — LISINOPRIL 20 MG/1
TABLET ORAL
COMMUNITY
Start: 2020-06-11 | End: 2021-09-24 | Stop reason: SINTOL

## 2020-08-27 RX ORDER — IBUPROFEN 800 MG/1
TABLET ORAL
COMMUNITY
Start: 2020-06-16

## 2020-08-27 RX ORDER — TRAZODONE HYDROCHLORIDE 50 MG/1
TABLET ORAL
COMMUNITY
Start: 2020-06-11

## 2020-08-27 NOTE — PROGRESS NOTES
This patient was seen in [facility] at [address] [place] [address] Urgent Care while in their vehicle due to COVID-19 pandemic with PPE and focused examination in order to decrease community viral transmission. The patient/guardian gave verbal consent to treat. Mychal Villeda is a 27 y.o. male who presents for with cough, STx 3 weeks. Currently on amoxicillin for dental infection. Has been on doxycycline and zpak in the past 3 weeks as well. No known COVID-19 exposure. Denies  fever, SOB. PMH: anxiety, HTN. Smoker/Non-smoker. The history is provided by the patient. Past Medical History:   Diagnosis Date    Anxiety     Asthma     Depression     Headache, variant migraine     Hypertension         Past Surgical History:   Procedure Laterality Date    HX ORTHOPAEDIC      foot    HX OTHER SURGICAL      cystocele         No family history on file.      Social History     Socioeconomic History    Marital status: SINGLE     Spouse name: Not on file    Number of children: Not on file    Years of education: Not on file    Highest education level: Not on file   Occupational History    Not on file   Social Needs    Financial resource strain: Not on file    Food insecurity     Worry: Not on file     Inability: Not on file    Transportation needs     Medical: Not on file     Non-medical: Not on file   Tobacco Use    Smoking status: Current Every Day Smoker     Packs/day: 0.25    Smokeless tobacco: Never Used   Substance and Sexual Activity    Alcohol use: No    Drug use: No    Sexual activity: Not on file   Lifestyle    Physical activity     Days per week: Not on file     Minutes per session: Not on file    Stress: Not on file   Relationships    Social connections     Talks on phone: Not on file     Gets together: Not on file     Attends Jainism service: Not on file     Active member of club or organization: Not on file     Attends meetings of clubs or organizations: Not on file     Relationship status: Not on file    Intimate partner violence     Fear of current or ex partner: Not on file     Emotionally abused: Not on file     Physically abused: Not on file     Forced sexual activity: Not on file   Other Topics Concern    Not on file   Social History Narrative    Not on file                ALLERGIES: Patient has no known allergies. Review of Systems   Constitutional: Negative for activity change, appetite change, chills and fever. HENT: Positive for sore throat. Negative for congestion and rhinorrhea. Respiratory: Positive for cough. Negative for shortness of breath and wheezing. Cardiovascular: Negative for chest pain. Gastrointestinal: Negative for abdominal pain, diarrhea, nausea and vomiting. Musculoskeletal: Negative for myalgias. Neurological: Negative for headaches. Vitals:    08/27/20 1314   Pulse: 66   Resp: 15   Temp: 98.2 °F (36.8 °C)   SpO2: 96%       Physical Exam  Vitals signs and nursing note reviewed. Constitutional:       General: He is not in acute distress. Appearance: He is well-developed. He is not diaphoretic. HENT:      Mouth/Throat:      Mouth: Mucous membranes are moist.      Pharynx: Oropharynx is clear. No oropharyngeal exudate or posterior oropharyngeal erythema. Pulmonary:      Effort: Pulmonary effort is normal. No respiratory distress. Breath sounds: Normal breath sounds. No stridor. No wheezing, rhonchi or rales. Neurological:      Mental Status: He is alert. Psychiatric:         Behavior: Behavior normal.         Thought Content: Thought content normal.         Judgment: Judgment normal.         MDM    ICD-10-CM ICD-9-CM   1. Cough  R05 786.2   2. Sore throat  J02.9 462   3.  Screening for viral disease  Z11.59 V73.99       Orders Placed This Encounter    NOVEL CORONAVIRUS (COVID-19)     Scheduling Instructions:      1) Due to current limited availability of the COVID-19 PCR test, tests will be prioritized and may not be completed.              2) Order only if the test result will change clinical management or necessary for a return to mission-critical employment decision.              3) Print and instruct patient to adhere to CDC home isolation program. (Link Above)              4) Set up or refer patient for a monitoring program.              5) Have patient sign up for and leverage MyChart (if not previously done). Order Specific Question:   Is this test for diagnosis or screening? Answer:   Diagnosis of ill patient     Order Specific Question:   Symptomatic for COVID-19 as defined by CDC? Answer:   Yes     Order Specific Question:   Date of Symptom Onset     Answer:   8/6/2020     Order Specific Question:   Hospitalized for COVID-19? Answer:   No     Order Specific Question:   Admitted to ICU for COVID-19? Answer:   No     Order Specific Question:   Employed in healthcare setting? Answer:   No     Order Specific Question:   Resident in a congregate (group) care setting? Answer:   No     Order Specific Question:   Previously tested for COVID-19? Answer:   Yes        Self Quarantine  Deep breathing exercises  Tylenol prn  Increase fluids  Follow up with PCP    If signs and symptoms become worse the pt is to go to the ER.          Procedures

## 2020-08-30 LAB — SARS-COV-2, NAA: NOT DETECTED

## 2021-02-25 ENCOUNTER — TRANSCRIBE ORDER (OUTPATIENT)
Dept: GENERAL RADIOLOGY | Age: 31
End: 2021-02-25

## 2021-02-25 ENCOUNTER — HOSPITAL ENCOUNTER (OUTPATIENT)
Dept: GENERAL RADIOLOGY | Age: 31
Discharge: HOME | End: 2021-02-25
Payer: COMMERCIAL

## 2021-02-25 DIAGNOSIS — M25.559 HIP PAIN: Primary | ICD-10-CM

## 2021-02-25 DIAGNOSIS — M25.559 HIP PAIN: ICD-10-CM

## 2021-02-25 PROCEDURE — 73521 X-RAY EXAM HIPS BI 2 VIEWS: CPT | Performed by: INTERNAL MEDICINE

## 2021-09-24 ENCOUNTER — OFFICE VISIT (OUTPATIENT)
Dept: NEUROLOGY | Age: 31
End: 2021-09-24
Payer: COMMERCIAL

## 2021-09-24 VITALS
DIASTOLIC BLOOD PRESSURE: 70 MMHG | BODY MASS INDEX: 29.12 KG/M2 | WEIGHT: 174.8 LBS | HEART RATE: 93 BPM | HEIGHT: 65 IN | OXYGEN SATURATION: 98 % | SYSTOLIC BLOOD PRESSURE: 130 MMHG

## 2021-09-24 DIAGNOSIS — G43.009 MIGRAINE WITHOUT AURA AND WITHOUT STATUS MIGRAINOSUS, NOT INTRACTABLE: Primary | ICD-10-CM

## 2021-09-24 PROCEDURE — 99203 OFFICE O/P NEW LOW 30 MIN: CPT | Performed by: PSYCHIATRY & NEUROLOGY

## 2021-09-24 RX ORDER — EMTRICITABINE AND TENOFOVIR DISOPROXIL FUMARATE 200; 300 MG/1; MG/1
1 TABLET, FILM COATED ORAL DAILY
COMMUNITY

## 2021-09-24 RX ORDER — AMITRIPTYLINE HYDROCHLORIDE 10 MG/1
10 TABLET, FILM COATED ORAL
Qty: 90 TABLET | Refills: 1 | Status: SHIPPED | OUTPATIENT
Start: 2021-09-24 | End: 2022-03-31 | Stop reason: SDUPTHER

## 2021-09-24 RX ORDER — BUTALBITAL, ACETAMINOPHEN AND CAFFEINE 50; 325; 40 MG/1; MG/1; MG/1
1 TABLET ORAL
COMMUNITY

## 2021-09-24 RX ORDER — PAROXETINE HYDROCHLORIDE 40 MG/1
40 TABLET, FILM COATED ORAL DAILY
COMMUNITY
End: 2022-03-31

## 2021-09-24 NOTE — PATIENT INSTRUCTIONS
-Please call if no improvement in headaches after taking amitriptyline nightly for 8 weeks.   -Please keep a headache log  -Increase water intake to 64oz  -Decrease caffeine to 8-16oz in AM only

## 2021-09-24 NOTE — LETTER
9/24/2021    Patient: Tammy Ivy   YOB: 1990   Date of Visit: 9/24/2021     Woodroe Schilder, MD  [facility]  [address] [facility] [address]  Via Fax: [phone]    Dear Woodroe Schilder, MD,      Thank you for referring Mr. Luc Yoo to [facility] for evaluation. My notes for this consultation are attached. If you have questions, please do not hesitate to call me. I look forward to following your patient along with you.       Sincerely,    Marin Cerna MD

## 2021-09-24 NOTE — PROGRESS NOTES
Neurology Consult Note      HISTORY PROVIDED BY: patient    Chief Complaint:   Chief Complaint   Patient presents with    New Patient    Migraine      Subjective:    Gala Burnette is a 32 y.o. right handed male who presents in consultation for headaches. Pt reports onset in  or , infrequent at first, but severe and associated with cognitive changes. They have become much more frequent this year, 3 times a week, last one yesterday and slept through the day, woke with ongoing HA again today. Pain right > left frontal aching pain, worse with movement, no N/V, +photophobia, no vision changes. Typically last all day unless sleep 4-6 hours, may subside or just lessen. Taking Fioricet for abortive. Drinks at least 3 x 16oz bottles. Drinks 2 x 16oz of caffeined soda. Has insomnia, may or may not sleep well. No known snoring. Cannot turn brain off. He is having surgery next week for severe GERD and HH.      Past Medical History:   Diagnosis Date    Anxiety     Depression     GERD (gastroesophageal reflux disease)     Headache, variant migraine     Hiatal hernia     Hypertension     Prematurity     Born 3 months early to a 16yo, hospitalized for 1 year due to breathing issues    Rickets     as a child in [facility]      Past Surgical History:   Procedure Laterality Date    HX ORTHOPAEDIC      foot    HX OTHER SURGICAL      cystocele, testicular, 13yo      Social History     Socioeconomic History    Marital status: SINGLE     Spouse name: Not on file    Number of children: Not on file    Years of education: Not on file    Highest education level: Not on file   Occupational History    Occupation: [employer]   Tobacco Use    Smoking status: Former Smoker     Packs/day: 0.25     Quit date: 2021     Years since quittin.5    Smokeless tobacco: Never Used   Substance and Sexual Activity    Alcohol use: No    Drug use: No    Sexual activity: Not on file   Other Topics Concern    Not on file   Social History Narrative    Lives in [place] with Mom, 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 Social Determinants of Health     Financial Resource Strain:     Difficulty of Paying Living Expenses:    Food Insecurity:     Worried About Running Out of Food in the Last Year:     [address] in the Last Year:    Transportation Needs:     Lack of Transportation (Medical):  Lack of Transportation (Non-Medical):    Physical Activity:     Days of Exercise per Week:     Minutes of Exercise per Session:    Stress:     Feeling of Stress :    Social Connections:     Frequency of Communication with Friends and Family:     Frequency of Social Gatherings with Friends and Family:     Attends [employer] Services:     Active Member of Clubs or Organizations:     Attends Club or Organization Meetings:     Marital Status:    Intimate Partner Violence:     Fear of Current or Ex-Partner:     Emotionally Abused:     Physically Abused:     Sexually Abused:      Family History   Adopted: Yes         Objective:   Review of Systems   Constitutional: Positive for malaise/fatigue. Poor appetite   HENT: Negative. Eyes: Negative. Respiratory: Positive for cough. Cardiovascular: Positive for chest pain. Gastrointestinal: Positive for constipation and diarrhea. Genitourinary: Negative. Musculoskeletal: Positive for joint pain. Skin: Negative. Neurological: Positive for headaches. Endo/Heme/Allergies: Negative. Psychiatric/Behavioral: The patient is nervous/anxious. Allergies   Allergen Reactions    Lisinopril Cough        Meds:    Current Outpatient Medications:     pantoprazole sodium (PROTONIX PO), Take  by mouth., Disp: , Rfl:     butalbital-acetaminophen-caffeine (FIORICET, ESGIC) -40 mg per tablet, Take 1 Tablet by mouth., Disp: , Rfl:     emtricitabine-tenofovir, TDF, (Truvada) 200-300 mg per tablet, Take 1 Tablet by mouth daily. , Disp: , Rfl:    PARoxetine (PAXIL) 40 mg tablet, Take 40 mg by mouth daily. , Disp: , Rfl:     ibuprofen (MOTRIN) 800 mg tablet, TAKE 1 TABLET BY MOUTH EVERY 4 TO 6 HOURS AS NEEDED, Disp: , Rfl:     buPROPion (WELLBUTRIN) 75 mg tablet, Take 75 mg by mouth two (2) times a day., Disp: , Rfl:     ALPRAZolam (XANAX) 0.25 mg tablet, Take 0.25 mg by mouth nightly as needed. , Disp: , Rfl:     traZODone (DESYREL) 50 mg tablet, TAKE 1 TABLET BY MOUTH EVERY DAY AT BEDTIME AS NEEDED FOR INSOMNIA FOR 90 DAYS, Disp: , Rfl:     amoxicillin (AMOXIL) 500 mg capsule, TAKE 1 CAPSULE BY MOUTH THREE TIMES DAILY UNTIL GONE (Patient not taking: Reported on 9/24/2021), Disp: , Rfl:       Imaging:  MRI Results (most recent):  No results found for this or any previous visit. CT Results (most recent):  Results from Hospital Encounter encounter on 12/14/19    CT HEAD WO CONT    Narrative  EXAM:  CT HEAD WO CONT    INDICATION:   headache, rt eye blurry vision    COMPARISON: CT head 8/4/2015. TECHNIQUE: Unenhanced CT of the head was performed using 5 mm images. Brain and  bone windows were generated. CT dose reduction was achieved through use of a  standardized protocol tailored for this examination and automatic exposure  control for dose modulation. FINDINGS:  The ventricles are normal in size and position. Basilar cisterns are patent. No  midline shift. There is no evidence of acute infarct, hemorrhage, or extraaxial  fluid collection. The paranasal sinuses, mastoid air cells, and middle ears are clear. The orbital  contents are within normal limits. There are no significant osseous or  extracranial soft tissue lesions. Impression  IMPRESSION:  1. No evidence of acute intracranial abnormality.        Reviewed records in [facility] and [facility] tab today    Lab Review   Results for orders placed or performed in visit on 08/27/20   NOVEL CORONAVIRUS (COVID-19)   Result Value Ref Range    SARS-CoV-2, JENARO Not Detected Not Detected Exam:  Visit Vitals  /70   Pulse 93   Ht 5' 5\" (1.651 m)   Wt 174 lb 12.8 oz (79.3 kg)   SpO2 98%   BMI 29.09 kg/m²     General:  Alert, cooperative, no distress. Head:  Normocephalic, without obvious abnormality, atraumatic. Respiratory:  Heart:   Non labored breathing  Regular rate and rhythm, no murmurs   Neck:   2+ carotids, no bruits   Extremities: Warm, no cyanosis or edema. Pulses: 2+ radial pulses. Neurologic:  MS: Alert and oriented x 4, speech intact. Language intact. Attention and fund of knowledge appropriate. Recent and remote memory intact. Cranial Nerves:  II: visual fields Full to confrontation   II: pupils Equal, round, reactive to light   II: optic disc    III,VII: ptosis none   III,IV,VI: extraocular muscles  EOMI, no nystagmus or diplopia   V: facial light touch sensation  normal   VII: facial muscle function   symmetric   VIII: hearing intact   IX: soft palate elevation  normal   XI: trapezius strength  5/5   XI: sternocleidomastoid strength 5/5   XII: tongue  Midline     Motor: normal bulk and tone, no tremor              Strength: 5/5 throughout, no PD  Sensory: intact to LT, PP  Coordination: FTN and HTS intact, ROSALBA intact  Gait: normal gait, able to tandem walk  Reflexes: 2+ symmetric, toes downgoing       Assessment/Plan   Pt is a 32 y.o. right handed male with onset of headaches in 2014 or 2016, infrequent at first, but severe and associated with cognitive changes, in last year have become much more frequent, now 3 times a week, lasting all day. Pain right > left frontal aching pain, worse with movement, +photophobia, has cognitive changes and is thakur with HA. Taking Fioricet for abortive tx. Exam with BMI 29, o/w non-focal and unremarkable. Headaches are consistent with migraine without any red flags on exam or in history to suggest more concerning etiology.   Recommend starting a migraine headache prevention medication, amitriptyline 10 mg nightly with beneficial side effect of sleepiness which may help with his insomnia. Recommend he try the amitriptyline without trazodone at first, he may not need both medications. Patient is encouraged to increase his water intake to 64 ounces a day and limit caffeine intake to 8-16 ounces in the morning only. He is asked to keep a headache log and to call the clinic in 8 weeks if there is no improvement in his headaches. Follow-up was made for next available, 6 months, instructed to call in the interim with any questions or concerns. ICD-10-CM ICD-9-CM    1. Migraine without aura and without status migrainosus, not intractable  G43.009 346.10        Signed:   Marti Hernandez MD  9/24/2021

## 2021-09-27 ENCOUNTER — TELEPHONE (OUTPATIENT)
Dept: NEUROLOGY | Age: 31
End: 2021-09-27

## 2021-09-27 NOTE — TELEPHONE ENCOUNTER
Re: Amitriptyline    rcvd PA request through [facility], Key# [id number] ([facility])  Submitted and awaiting update.     PA Case# [id number]

## 2021-10-11 NOTE — TELEPHONE ENCOUNTER
Re: Amitriptyline    Rcvd letter dated 09/27/21 stating medication is on formulary and PA is not needed. Scanned letter to chart.

## 2022-03-31 ENCOUNTER — OFFICE VISIT (OUTPATIENT)
Dept: NEUROLOGY | Age: 32
End: 2022-03-31
Payer: COMMERCIAL

## 2022-03-31 VITALS
RESPIRATION RATE: 16 BRPM | TEMPERATURE: 97.6 F | HEART RATE: 80 BPM | OXYGEN SATURATION: 97 % | SYSTOLIC BLOOD PRESSURE: 132 MMHG | DIASTOLIC BLOOD PRESSURE: 84 MMHG

## 2022-03-31 DIAGNOSIS — G43.009 MIGRAINE WITHOUT AURA AND WITHOUT STATUS MIGRAINOSUS, NOT INTRACTABLE: Primary | ICD-10-CM

## 2022-03-31 PROCEDURE — 99213 OFFICE O/P EST LOW 20 MIN: CPT | Performed by: PSYCHIATRY & NEUROLOGY

## 2022-03-31 RX ORDER — SUMATRIPTAN 100 MG/1
100 TABLET, FILM COATED ORAL
Qty: 9 TABLET | Refills: 11 | Status: SHIPPED | OUTPATIENT
Start: 2022-03-31

## 2022-03-31 RX ORDER — AMITRIPTYLINE HYDROCHLORIDE 10 MG/1
20 TABLET, FILM COATED ORAL
Qty: 180 TABLET | Refills: 1 | Status: SHIPPED | OUTPATIENT
Start: 2022-03-31 | End: 2022-08-18 | Stop reason: SDUPTHER
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
## 2022-03-31 NOTE — LETTER
3/31/2022    Patient: Qamar Gomes   YOB: 1990   Date of Visit: 3/31/2022     Satnam Krueger MD  [address] [place] [address] [address] [phone]  Via Fax: [phone]    Dear Satnam Krueger MD,      Thank you for referring Mr. Ladonna Cerda to [address] [place] [address] for evaluation. My notes for this consultation are attached. If you have questions, please do not hesitate to call me. I look forward to following your patient along with you.       Sincerely,    Martha Guardado MD

## 2022-03-31 NOTE — PROGRESS NOTES
Chief Complaint   Patient presents with    Follow-up     Follow up migraines; states recently Amitriptyline effectiveness is wavering; Still having migraines/headaches as least once weekly;  Has has esophogeal surgery since last visit      Visit Vitals  /84 (BP 1 Location: Left arm, BP Patient Position: Sitting, BP Cuff Size: Large adult)   Pulse 80   Temp 97.6 °F (36.4 °C) (Temporal)   Resp 16   SpO2 97%

## 2022-08-18 ENCOUNTER — TELEPHONE (OUTPATIENT)
Dept: NEUROLOGY | Age: 32
End: 2022-08-18

## 2022-08-18 RX ORDER — SUMATRIPTAN 100 MG/1
100 TABLET, FILM COATED ORAL
Qty: 9 TABLET | Refills: 11 | Status: CANCELLED | OUTPATIENT
Start: 2022-08-18

## 2022-08-22 RX ORDER — AMITRIPTYLINE HYDROCHLORIDE 10 MG/1
20 TABLET, FILM COATED ORAL
Qty: 180 TABLET | Refills: 1 | Status: SHIPPED | OUTPATIENT
Start: 2022-08-22

## 2022-08-22 NOTE — TELEPHONE ENCOUNTER
Pt last seen in March, was to follow up in 6 months, no fu made. Imitrex was refilled for an entire year, so until 3/2023, not refilled today. Refilled amitriptyline for 6 months, needs fu for future refills.

## 2022-12-13 ENCOUNTER — HOSPITAL ENCOUNTER (EMERGENCY)
Age: 32
Discharge: HOME | End: 2022-12-13
Attending: EMERGENCY MEDICINE

## 2022-12-13 VITALS
BODY MASS INDEX: 30.07 KG/M2 | TEMPERATURE: 98.3 F | HEART RATE: 96 BPM | DIASTOLIC BLOOD PRESSURE: 75 MMHG | OXYGEN SATURATION: 98 % | RESPIRATION RATE: 18 BRPM | SYSTOLIC BLOOD PRESSURE: 125 MMHG | WEIGHT: 176.15 LBS | HEIGHT: 64 IN

## 2022-12-13 DIAGNOSIS — U07.1 COVID-19: Primary | ICD-10-CM

## 2022-12-13 LAB
FLUAV AG NPH QL IA: NEGATIVE
FLUBV AG NOSE QL IA: NEGATIVE

## 2022-12-13 PROCEDURE — 99283 EMERGENCY DEPT VISIT LOW MDM: CPT

## 2022-12-13 PROCEDURE — 74011250636 HC RX REV CODE- 250/636: Performed by: EMERGENCY MEDICINE

## 2022-12-13 PROCEDURE — 87804 INFLUENZA ASSAY W/OPTIC: CPT

## 2022-12-13 RX ORDER — METHYLPREDNISOLONE 4 MG/1
TABLET ORAL
Qty: 1 DOSE PACK | Refills: 0 | Status: SHIPPED | OUTPATIENT
Start: 2022-12-13

## 2022-12-13 RX ORDER — DEXAMETHASONE 4 MG/1
10 TABLET ORAL ONCE
Status: COMPLETED | OUTPATIENT
Start: 2022-12-13 | End: 2022-12-13

## 2022-12-13 RX ADMIN — DEXAMETHASONE 10 MG: 4 TABLET ORAL at 14:32

## 2022-12-13 NOTE — Clinical Note
[address] EMERGENCY DEPT  [address] [facility] [address]  [phone]    Work/School Note    Date: 12/13/2022    To Whom It May concern:    Qamar Gomes was seen and treated today in the emergency room by the following provider(s):  Attending Provider: Chico Sierra MD.      Qamar Gomes is excused from work/school on 12/13/2022 through 12/15/2022. He is medically clear to return to work/school on 12/16/2022.          Sincerely,          Halie Singh MD

## 2022-12-13 NOTE — ED TRIAGE NOTES
Pt reports flu like symptoms since last night. Pt tested positive for COVID today.  Pt is requesting paxlovid

## 2022-12-13 NOTE — ED PROVIDER NOTES
80-year-old male presents to the ER for 3 days of upper respiratory infection symptoms, body aches, chills, cough. Patient have a COVID test this morning which came back as positive so he came in initially asking for paxlovid. He is not requiring supplemental oxygen or hemodynamic support. Denies any underlying history of lung or heart disease. He is a non-smoker. ROS:  Constitutional: Positive for chills and fever. Positive for body aches. Eyes: Negative for vision change or loss. ENT and mouth: Negative for ear drainage, epistaxis, or mouth sores. Cardiovascular: Negative for chest pain. Respiratory: No wheezing or shortness of breath. Positive for cough. Gastrointestinal: No melena or BRB per rectum. Musculoskeletal: No loss of range of motion. Neurologic: No unilateral weakness. Integumentary: No rash. Psychiatric: Negative for SI.    10 level review of systems is otherwise negative except as noted above in the ROS and in the history of present illness. Reviewed and agree with available nursing notes. Available prior ED visit history reviewed. Vital signs were reviewed and oxygenation is adequate. Physical exam:  Constitutional: Awake, alert, not in severe distress. Head and neck: Normocephalic, atraumatic. No JVD, no nuchal rigidity. ENT and mouth: No active epistaxis, external ears normal, trachea is midline. Eyes: Extraocular movements intact, no periorbital edema. Cardiovascular: Regular rate, regular rhythm. Respiratory: No increased work of breathing, no signs of pending respiratory failure. Clear to auscultation bilaterally. Gastrointestinal: Nondistended. Musculoskeletal: Free range of motion, no edema. Integumentary: Warm and dry, no rash, skin is intact. Neurologic: Normal speech, no lateralizing neurologic deficit. Psychiatric: Appropriate affect, not responding to internal stimuli.     Sinusitis, bronchitis, bronchiolitis, asthma, COPD, acute exacerbation of chronic bronchitis, smoking addiction, pharyngitis, otitis media, peritonsillar abscess, parotiditis, viral illness, upper respiratory infection, seasonal allergies, pneumonia, dehydration, allergic reaction, rhinitis, COVID-19, RSV, influenza. Medical decision making and ED course: Patient presenting today with uncomplicated viral illness he was given a dose of Decadron here I will send him home today with rx for Paxlovid, in addition to a prescription for Phenergan to use as needed for nausea. Recommend aggressive oral hydration and return to the ER as needed for worsening symptoms. I counseled the patient on withholding his trazodone and using his alprazolam cautiously with concurrent Paxlovid use, pt is aware of potential interactions and still wants the rx for the antiviral medication.        Positive For Covid-19     Past Medical History:   Diagnosis Date    Anxiety     Depression     GERD (gastroesophageal reflux disease)     Headache, variant migraine     Hiatal hernia     Hypertension     Prematurity     Born 3 months early to a 16yo, hospitalized for 1 year due to breathing issues    Rickets     as a child in [facility]       Past Surgical History:   Procedure Laterality Date    HX HERNIA REPAIR      hiatal hernia    HX ORTHOPAEDIC      foot    HX OTHER SURGICAL      cystocele, testicular, 13yo         Family History:   Adopted: Yes       Social History     Socioeconomic History    Marital status: SINGLE     Spouse name: Not on file    Number of children: Not on file    Years of education: Not on file    Highest education level: Not on file   Occupational History    Occupation: [employer]   Tobacco Use    Smoking status: Former     Packs/day: 0.25     Types: Cigarettes     Quit date: 2021     Years since quittin.7    Smokeless tobacco: Never   Substance and Sexual Activity    Alcohol use: No    Drug use: No    Sexual activity: Not on file   Other Topics Concern    Not on file   Social History Narrative    Lives in [address] with Mom, 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       Social Determinants of Health     Financial Resource Strain: Not on file   Food Insecurity: Not on file   Transportation Needs: Not on file   Physical Activity: Sufficiently Active    Days of Exercise per Week: 5 days    Minutes of Exercise per Session: 120 min   Stress: Not on file   Social Connections: Not on file   Intimate Partner Violence: Not At Risk    Fear of Current or Ex-Partner: No    Emotionally Abused: No    Physically Abused: No    Sexually Abused: No   Housing Stability: Not on file         ALLERGIES: Lisinopril    Review of Systems    Vitals:    12/13/22 1324   BP: (!) 155/75   Pulse: 96   Resp: 18   Temp: 98.3 °F (36.8 °C)   SpO2: 99%   Weight: 79.9 kg (176 lb 2.4 oz)   Height: 5' 4\" (1.626 m)            Physical Exam     MDM         Procedures

## 2022-12-13 NOTE — ED NOTES
The patient was discharged by Dr. Gwenith Skiff. Two prescriptions e-scribed to pharmacy. Patient ambulatory to discharge.

## 2022-12-13 NOTE — ED NOTES
Pt resting on stretcher in no obvious distress. Time factors for results discussed with patient.  Call bell in reach

## 2022-12-16 ENCOUNTER — APPOINTMENT (OUTPATIENT)
Dept: GENERAL RADIOLOGY | Age: 32
End: 2022-12-16
Attending: EMERGENCY MEDICINE

## 2022-12-16 ENCOUNTER — HOSPITAL ENCOUNTER (EMERGENCY)
Age: 32
Discharge: HOME | End: 2022-12-16
Attending: EMERGENCY MEDICINE

## 2022-12-16 VITALS
SYSTOLIC BLOOD PRESSURE: 132 MMHG | HEIGHT: 64 IN | TEMPERATURE: 98.3 F | HEART RATE: 71 BPM | WEIGHT: 171.96 LBS | OXYGEN SATURATION: 98 % | DIASTOLIC BLOOD PRESSURE: 76 MMHG | RESPIRATION RATE: 18 BRPM | BODY MASS INDEX: 29.36 KG/M2

## 2022-12-16 DIAGNOSIS — U07.1 COVID: Primary | ICD-10-CM

## 2022-12-16 PROCEDURE — 71045 X-RAY EXAM CHEST 1 VIEW: CPT

## 2022-12-16 PROCEDURE — 99283 EMERGENCY DEPT VISIT LOW MDM: CPT

## 2022-12-16 RX ORDER — BENZONATATE 100 MG/1
100 CAPSULE ORAL
Qty: 30 CAPSULE | Refills: 0 | Status: SHIPPED | OUTPATIENT
Start: 2022-12-16 | End: 2022-12-26

## 2022-12-16 RX ORDER — AZITHROMYCIN 250 MG/1
TABLET, FILM COATED ORAL
Qty: 6 TABLET | Refills: 0 | Status: SHIPPED | OUTPATIENT
Start: 2022-12-16

## 2022-12-16 RX ORDER — ALBUTEROL SULFATE 90 UG/1
2 AEROSOL, METERED RESPIRATORY (INHALATION)
Qty: 8 G | Refills: 0 | Status: SHIPPED | OUTPATIENT
Start: 2022-12-16

## 2022-12-16 NOTE — ED NOTES
Patient  discharged with instructions per Dr Fidel Mckenzie. Instructions reviewed with pt. Deyanira Garsia

## 2022-12-16 NOTE — ED PROVIDER NOTES
Date of Service:  2022    Patient:  Mine Mattson    Chief Complaint:  Positive For Covid-19 and Cough       HPI:  Mine Mattson is a 28 y.o.  male who presents for evaluation of COVID. Patient tested positive for COVID about 4 days ago. He presents here concern for secondary pneumonia, persistent cough and hiccups. No nausea or vomiting. He states that he just generally does not feel well. He states that he feels fatigued. No other complaints       Past Medical History:   Diagnosis Date    Anxiety     Depression     GERD (gastroesophageal reflux disease)     Headache, variant migraine     Hiatal hernia     Hypertension     Prematurity     Born 3 months early to a 16yo, hospitalized for 1 year due to breathing issues    Rickets     as a child in [facility]       Past Surgical History:   Procedure Laterality Date    HX HERNIA REPAIR      hiatal hernia    HX ORTHOPAEDIC      foot    HX OTHER SURGICAL      cystocele, testicular, 11yo         Family History:   Adopted: Yes       Social History     Socioeconomic History    Marital status: SINGLE     Spouse name: Not on file    Number of children: Not on file    Years of education: Not on file    Highest education level: Not on file   Occupational History    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       Social Determinants of Health     Financial Resource Strain: Not on file   Food Insecurity: Not on file   Transportation Needs: Not on file   Physical Activity: Sufficiently Active    Days of Exercise per Week: 5 days    Minutes of Exercise per Session: 120 min   Stress: Not on file Social Connections: Not on file   Intimate Partner Violence: Not At Risk    Fear of Current or Ex-Partner: No    Emotionally Abused: No    Physically Abused: No    Sexually Abused: No   Housing Stability: Not on file         ALLERGIES: Lisinopril    Review of Systems   All other systems reviewed and are negative. Vitals:    12/16/22 1133   BP: 132/76   Pulse: 71   Resp: 18   Temp: 98.3 °F (36.8 °C)   SpO2: 98%   Weight: 78 kg (171 lb 15.3 oz)   Height: 5' 4\" (1.626 m)            Physical Exam  Vitals and nursing note reviewed. Constitutional:       Appearance: Normal appearance. HENT:      Head: Normocephalic and atraumatic. Nose: Nose normal.   Eyes:      General: No scleral icterus. Cardiovascular:      Rate and Rhythm: Normal rate. Pulmonary:      Effort: Pulmonary effort is normal.      Breath sounds: Normal breath sounds. Abdominal:      General: Abdomen is flat. Musculoskeletal:         General: No deformity. Skin:     General: Skin is warm. Neurological:      Mental Status: He is alert and oriented to person, place, and time. Psychiatric:         Mood and Affect: Mood normal.        MDM     VITAL SIGNS:  Patient Vitals for the past 4 hrs:   Temp Pulse Resp BP SpO2   12/16/22 1133 98.3 °F (36.8 °C) 71 18 132/76 98 %         LABS:  No results found for this or any previous visit (from the past 6 hour(s)). IMAGING:  XR CHEST PORT   Final Result      Right perihilar subsegmental atelectasis. Recommend PA and lateral chest views   when the patient can better tolerate. Medications During Visit:  Medications - No data to display      DECISION MAKING:  Sherman Austin is a 28 y.o. male who comes in as above. Well-appearing patient. He is COVID-positive. Chest x-ray shows a small little area of opacity, given his symptoms we will treat with a Z-Diaz to make sure were not missing any secondary pneumonia. Otherwise medicines as below for symptomatic treatment.   Follow-up with PCP and return as needed      IMPRESSION:  1. COVID        DISPOSITION:  Discharged      Current Discharge Medication List        START taking these medications    Details   albuterol (Ventolin HFA) 90 mcg/actuation inhaler Take 2 Puffs by inhalation every four (4) hours as needed for Wheezing. Qty: 8 g, Refills: 0  Start date: 12/16/2022      benzonatate (Tessalon Perles) 100 mg capsule Take 1 Capsule by mouth three (3) times daily as needed for Cough for up to 10 days. Qty: 30 Capsule, Refills: 0  Start date: 12/16/2022, End date: 12/26/2022      azithromycin (ZITHROMAX) 250 mg tablet Take as directed  Qty: 6 Tablet, Refills: 0  Start date: 12/16/2022              Follow-up Information       Follow up With Specialties Details Why Contact Info    Madison Eaton MD Internal Medicine Physician Schedule an appointment as soon as possible for a visit   [address] [place] [address] [phone]                The patient is asked to follow-up with their primary care provider in the next several days. They are to call tomorrow for an appointment. The patient is asked to return promptly for any increased concerns or worsening of symptoms. They can return to this emergency department or any other emergency department.       Procedures

## 2022-12-16 NOTE — ED TRIAGE NOTES
Pt presents to Ed with c/o persistent hiccoughs after positive COVID test. Pt is concerned he may have pneumonia. He was diagnosed with COVID on 12/13/2022.

## 2022-12-16 NOTE — Clinical Note
[address] EMERGENCY DEPT  [place] [id number]  [phone]    Work/School Note    Date: 12/16/2022     To Whom It May concern:    Hipolito Babcock was evaluated by the following provider(s):  Attending Provider: Esther Henderson virus is suspected. Per the CDC guidelines we recommend home isolation until the following conditions are all met:    1. At least five days have passed since symptoms first appeared and/or had a close exposure,   2. After home isolation for five days, wearing a mask around others for the next five days,  3. At least 24 have passed since last fever without the use of fever-reducing medications and  4.  Symptoms (eg cough, shortness of breath) have improved      Sincerely,          Mane Vu,

## 2024-08-12 ENCOUNTER — HOSPITAL ENCOUNTER (EMERGENCY)
Facility: HOSPITAL | Age: 34
Discharge: HOME | End: 2024-08-12
Attending: EMERGENCY MEDICINE

## 2024-08-12 VITALS
RESPIRATION RATE: 18 BRPM | HEART RATE: 78 BPM | TEMPERATURE: 98.1 F | OXYGEN SATURATION: 97 % | WEIGHT: 165 LBS | SYSTOLIC BLOOD PRESSURE: 142 MMHG | HEIGHT: 65 IN | DIASTOLIC BLOOD PRESSURE: 94 MMHG | BODY MASS INDEX: 27.49 KG/M2

## 2024-08-12 DIAGNOSIS — J06.9 VIRAL URI: Primary | ICD-10-CM

## 2024-08-12 PROCEDURE — 99282 EMERGENCY DEPT VISIT SF MDM: CPT

## 2024-08-12 ASSESSMENT — ENCOUNTER SYMPTOMS
NAUSEA: 0
VOMITING: 0
BACK PAIN: 0
COLOR CHANGE: 0
SHORTNESS OF BREATH: 0
ABDOMINAL PAIN: 0
DIARRHEA: 0
CONSTIPATION: 0
SORE THROAT: 1

## 2024-08-12 NOTE — ED TRIAGE NOTES
Pt states he began having a fever on Wednesday. Fever has been intermittent since then and pt reports taking cold medication. Pt states he breaks out into sweats and began sneezing and feeling weak. Pt states if he walks around much he starts to feel dizzy. Pt thinks he has the flu.

## 2024-08-12 NOTE — ED PROVIDER NOTES
[facility] EMERGENCY DEPT  EMERGENCY DEPARTMENT ENCOUNTER      Pt Name: Perfecto Orellana  MRN: [id number]  Birthdate 1990  Date of evaluation: 8/12/2024  Provider: Herbert Dwyer MD    CHIEF COMPLAINT       Chief Complaint   Patient presents with    URI         HISTORY OF PRESENT ILLNESS   (Location/Symptom, Timing/Onset, Context/Setting, Quality, Duration, Modifying Factors, Severity)  Note limiting factors.   Perfecto Orellana is a 34 y.o. male who presents to the emergency department      The history is provided by the patient. No  was used.   Fever  Severity:  Moderate  Onset quality:  Sudden  Timing:  Intermittent  Progression:  Resolved  Chronicity:  New  Relieved by:  Acetaminophen  Associated symptoms: congestion and sore throat    Associated symptoms: no chest pain, no chills, no confusion, no diarrhea, no dysuria, no headaches, no nausea and no vomiting        Nursing Notes were reviewed.    REVIEW OF SYSTEMS    (2-9 systems for level 4, 10 or more for level 5)     Review of Systems   Constitutional:  Positive for fever. Negative for activity change and chills.   HENT:  Positive for congestion and sore throat. Negative for nosebleeds.    Eyes:  Negative for visual disturbance.   Respiratory:  Negative for shortness of breath.    Cardiovascular:  Negative for chest pain and palpitations.   Gastrointestinal:  Negative for abdominal pain, constipation, diarrhea, nausea and vomiting.   Genitourinary:  Negative for difficulty urinating, dysuria, hematuria and urgency.   Musculoskeletal:  Negative for back pain, neck pain and neck stiffness.   Skin:  Negative for color change.   Allergic/Immunologic: Negative for immunocompromised state.   Neurological:  Negative for dizziness, seizures, syncope, weakness, light-headedness, numbness and headaches.   Psychiatric/Behavioral:  Negative for behavioral problems, confusion, hallucinations, self-injury and suicidal ideas.        Except as noted above the remainder